# Patient Record
Sex: FEMALE | Race: WHITE
[De-identification: names, ages, dates, MRNs, and addresses within clinical notes are randomized per-mention and may not be internally consistent; named-entity substitution may affect disease eponyms.]

---

## 2018-03-18 ENCOUNTER — HOSPITAL ENCOUNTER (EMERGENCY)
Dept: HOSPITAL 12 - ER | Age: 68
LOS: 1 days | Discharge: HOME | End: 2018-03-19
Payer: MEDICARE

## 2018-03-18 VITALS — WEIGHT: 140 LBS | HEIGHT: 60 IN | BODY MASS INDEX: 27.48 KG/M2

## 2018-03-18 DIAGNOSIS — Z79.82: ICD-10-CM

## 2018-03-18 DIAGNOSIS — J44.1: ICD-10-CM

## 2018-03-18 DIAGNOSIS — J20.9: Primary | ICD-10-CM

## 2018-03-18 DIAGNOSIS — Z79.899: ICD-10-CM

## 2018-03-18 DIAGNOSIS — E03.9: ICD-10-CM

## 2018-03-18 DIAGNOSIS — J45.901: ICD-10-CM

## 2018-03-18 DIAGNOSIS — Z79.51: ICD-10-CM

## 2018-03-18 LAB
ALP SERPL-CCNC: 68 U/L (ref 50–136)
ALT SERPL W/O P-5'-P-CCNC: 22 U/L (ref 14–59)
AST SERPL-CCNC: 20 U/L (ref 15–37)
BASOPHILS # BLD AUTO: 0.1 K/UL (ref 0–8)
BASOPHILS NFR BLD AUTO: 1 % (ref 0–2)
BILIRUB DIRECT SERPL-MCNC: 0.1 MG/DL (ref 0–0.2)
BILIRUB SERPL-MCNC: 0.3 MG/DL (ref 0.2–1)
BUN SERPL-MCNC: 17 MG/DL (ref 7–18)
CHLORIDE SERPL-SCNC: 101 MMOL/L (ref 98–107)
CO2 SERPL-SCNC: 29 MMOL/L (ref 21–32)
CREAT SERPL-MCNC: 0.9 MG/DL (ref 0.6–1.3)
EOSINOPHIL # BLD AUTO: 0.7 K/UL (ref 0–0.7)
EOSINOPHIL NFR BLD AUTO: 7.4 % (ref 0–7)
GLUCOSE SERPL-MCNC: 104 MG/DL (ref 74–106)
HCT VFR BLD AUTO: 41.6 % (ref 31.2–41.9)
HGB BLD-MCNC: 13.5 G/DL (ref 10.9–14.3)
LYMPHOCYTES # BLD AUTO: 3.2 K/UL (ref 20–40)
LYMPHOCYTES NFR BLD AUTO: 34.5 % (ref 20.5–51.5)
MCH RBC QN AUTO: 28.2 UUG (ref 24.7–32.8)
MCHC RBC AUTO-ENTMCNC: 33 G/DL (ref 32.3–35.6)
MCV RBC AUTO: 86.9 FL (ref 75.5–95.3)
MONOCYTES # BLD AUTO: 1.2 K/UL (ref 2–10)
MONOCYTES NFR BLD AUTO: 12.8 % (ref 0–11)
NEUTROPHILS # BLD AUTO: 4.1 K/UL (ref 1.8–8.9)
NEUTROPHILS NFR BLD AUTO: 44.3 % (ref 38.5–71.5)
PLATELET # BLD AUTO: 259 K/UL (ref 179–408)
POTASSIUM SERPL-SCNC: 4.3 MMOL/L (ref 3.5–5.1)
RBC # BLD AUTO: 4.79 MIL/UL (ref 3.63–4.92)
WBC # BLD AUTO: 9.3 K/UL (ref 3.8–11.8)
WS STN SPEC: 7.7 G/DL (ref 6.4–8.2)

## 2018-03-18 PROCEDURE — A4663 DIALYSIS BLOOD PRESSURE CUFF: HCPCS

## 2018-03-18 NOTE — NUR
PT IN BED. PT'S SPOUSE AT BEDSIDE. PT IS RECEIVING BREATHING TX. PT IS A&OX4. 
BREATHING TX WELL TOLERATED. VS WNL. WILL RE-ASSESS RESPIRATORY STATUS AFTER 
BREATHING TX.

## 2018-03-18 NOTE — NUR
PT IN BED. PT'S SPOUSE AT BEDSIDE. PT'S BREATHING SOUNDS ARE REGULAR AND 
UNLABORED. PT IS ON NASAL CANULA @ 2 LTS. PT IS RECEIVING ANTIBIOTIC THERAPY. 
TX BEING TOLERATED WELL. NO SIGNS OR SYMPTOMS OF DISTRESS WITNESSED BY NURSE OR 
EXPRESSED BY PT.

## 2018-03-19 VITALS — SYSTOLIC BLOOD PRESSURE: 104 MMHG | DIASTOLIC BLOOD PRESSURE: 63 MMHG

## 2018-03-19 NOTE — NUR
Patient discharged to home in stable conditon.  Written and verbal after care 
instructions given. 

Patient verbalizes understanding of instructions. Patient able to reach an 
oxygen saturation level above 95% on room air and reporting no difficulties in 
breathing. Patient able to ambulate unassisted with steady gait. Peripheral IV 
removed. Patient left with all personal belongings.

## 2018-03-20 ENCOUNTER — HOSPITAL ENCOUNTER (INPATIENT)
Dept: HOSPITAL 12 - ER | Age: 68
LOS: 4 days | Discharge: HOME | DRG: 202 | End: 2018-03-24
Payer: MEDICAID

## 2018-03-20 VITALS — DIASTOLIC BLOOD PRESSURE: 58 MMHG | SYSTOLIC BLOOD PRESSURE: 100 MMHG

## 2018-03-20 VITALS — BODY MASS INDEX: 26.58 KG/M2 | HEIGHT: 63 IN | WEIGHT: 150 LBS

## 2018-03-20 VITALS — SYSTOLIC BLOOD PRESSURE: 135 MMHG | DIASTOLIC BLOOD PRESSURE: 66 MMHG

## 2018-03-20 VITALS — DIASTOLIC BLOOD PRESSURE: 58 MMHG | SYSTOLIC BLOOD PRESSURE: 115 MMHG

## 2018-03-20 VITALS — DIASTOLIC BLOOD PRESSURE: 63 MMHG | SYSTOLIC BLOOD PRESSURE: 114 MMHG

## 2018-03-20 DIAGNOSIS — E03.9: ICD-10-CM

## 2018-03-20 DIAGNOSIS — E87.2: ICD-10-CM

## 2018-03-20 DIAGNOSIS — Z79.82: ICD-10-CM

## 2018-03-20 DIAGNOSIS — H91.90: ICD-10-CM

## 2018-03-20 DIAGNOSIS — M19.019: ICD-10-CM

## 2018-03-20 DIAGNOSIS — J45.901: Primary | ICD-10-CM

## 2018-03-20 DIAGNOSIS — I50.32: ICD-10-CM

## 2018-03-20 DIAGNOSIS — E86.0: ICD-10-CM

## 2018-03-20 DIAGNOSIS — E78.5: ICD-10-CM

## 2018-03-20 DIAGNOSIS — J20.9: ICD-10-CM

## 2018-03-20 DIAGNOSIS — E78.00: ICD-10-CM

## 2018-03-20 LAB
ALP SERPL-CCNC: 66 U/L (ref 50–136)
ALT SERPL W/O P-5'-P-CCNC: 24 U/L (ref 14–59)
AST SERPL-CCNC: 23 U/L (ref 15–37)
BASOPHILS # BLD AUTO: 0 K/UL (ref 0–8)
BASOPHILS NFR BLD AUTO: 0.3 % (ref 0–2)
BILIRUB DIRECT SERPL-MCNC: 0.1 MG/DL (ref 0–0.2)
BILIRUB SERPL-MCNC: 0.3 MG/DL (ref 0.2–1)
BUN SERPL-MCNC: 18 MG/DL (ref 7–18)
CHLORIDE SERPL-SCNC: 104 MMOL/L (ref 98–107)
CO2 SERPL-SCNC: 26 MMOL/L (ref 21–32)
CREAT SERPL-MCNC: 0.9 MG/DL (ref 0.6–1.3)
EOSINOPHIL # BLD AUTO: 0 K/UL (ref 0–0.7)
EOSINOPHIL NFR BLD AUTO: 0 % (ref 0–7)
GLUCOSE SERPL-MCNC: 153 MG/DL (ref 74–106)
HCT VFR BLD AUTO: 41.3 % (ref 31.2–41.9)
HGB BLD-MCNC: 13.5 G/DL (ref 10.9–14.3)
LYMPHOCYTES # BLD AUTO: 1 K/UL (ref 20–40)
LYMPHOCYTES NFR BLD AUTO: 6.3 % (ref 20.5–51.5)
MCH RBC QN AUTO: 28.4 UUG (ref 24.7–32.8)
MCHC RBC AUTO-ENTMCNC: 33 G/DL (ref 32.3–35.6)
MCV RBC AUTO: 86.7 FL (ref 75.5–95.3)
MONOCYTES # BLD AUTO: 0.3 K/UL (ref 2–10)
MONOCYTES NFR BLD AUTO: 2.2 % (ref 0–11)
NEUTROPHILS # BLD AUTO: 13.9 K/UL (ref 1.8–8.9)
NEUTROPHILS NFR BLD AUTO: 91.2 % (ref 38.5–71.5)
PLATELET # BLD AUTO: 231 K/UL (ref 179–408)
POTASSIUM SERPL-SCNC: 4.8 MMOL/L (ref 3.5–5.1)
RBC # BLD AUTO: 4.77 MIL/UL (ref 3.63–4.92)
WBC # BLD AUTO: 15.3 K/UL (ref 3.8–11.8)
WS STN SPEC: 7.3 G/DL (ref 6.4–8.2)

## 2018-03-20 PROCEDURE — A4663 DIALYSIS BLOOD PRESSURE CUFF: HCPCS

## 2018-03-20 RX ADMIN — ALBUTEROL SULFATE SCH MG: 2.5 SOLUTION RESPIRATORY (INHALATION) at 08:22

## 2018-03-20 RX ADMIN — IPRATROPIUM BROMIDE SCH MG: 0.5 SOLUTION RESPIRATORY (INHALATION) at 15:03

## 2018-03-20 RX ADMIN — ALBUTEROL SULFATE SCH MG: 2.5 SOLUTION RESPIRATORY (INHALATION) at 04:12

## 2018-03-20 RX ADMIN — ALBUTEROL SULFATE SCH MG: 2.5 SOLUTION RESPIRATORY (INHALATION) at 23:30

## 2018-03-20 RX ADMIN — ALBUTEROL SULFATE SCH MG: 2.5 SOLUTION RESPIRATORY (INHALATION) at 19:30

## 2018-03-20 RX ADMIN — ALBUTEROL SULFATE SCH MG: 2.5 SOLUTION RESPIRATORY (INHALATION) at 07:27

## 2018-03-20 RX ADMIN — ACETAMINOPHEN PRN MG: 325 TABLET ORAL at 11:01

## 2018-03-20 RX ADMIN — ALBUTEROL SULFATE SCH MG: 2.5 SOLUTION RESPIRATORY (INHALATION) at 15:30

## 2018-03-20 RX ADMIN — IPRATROPIUM BROMIDE SCH MG: 0.5 SOLUTION RESPIRATORY (INHALATION) at 22:57

## 2018-03-20 RX ADMIN — IPRATROPIUM BROMIDE SCH MG: 0.5 SOLUTION RESPIRATORY (INHALATION) at 11:30

## 2018-03-20 RX ADMIN — ACETAMINOPHEN PRN MG: 325 TABLET ORAL at 20:29

## 2018-03-20 RX ADMIN — LEVOTHYROXINE SODIUM SCH MCG: 75 TABLET ORAL at 11:00

## 2018-03-20 RX ADMIN — IPRATROPIUM BROMIDE SCH MG: 0.5 SOLUTION RESPIRATORY (INHALATION) at 19:14

## 2018-03-20 RX ADMIN — ATORVASTATIN CALCIUM SCH MG: 20 TABLET, FILM COATED ORAL at 10:45

## 2018-03-20 RX ADMIN — ALBUTEROL SULFATE SCH MG: 2.5 SOLUTION RESPIRATORY (INHALATION) at 11:30

## 2018-03-20 RX ADMIN — DEXTROSE SCH MLS/HR: 5 SOLUTION INTRAVENOUS at 11:31

## 2018-03-20 RX ADMIN — ALBUTEROL SULFATE SCH MG: 2.5 SOLUTION RESPIRATORY (INHALATION) at 19:14

## 2018-03-20 RX ADMIN — ALBUTEROL SULFATE SCH MG: 2.5 SOLUTION RESPIRATORY (INHALATION) at 22:57

## 2018-03-20 RX ADMIN — IPRATROPIUM BROMIDE SCH MG: 0.5 SOLUTION RESPIRATORY (INHALATION) at 04:12

## 2018-03-20 RX ADMIN — IPRATROPIUM BROMIDE SCH MG: 0.5 SOLUTION RESPIRATORY (INHALATION) at 07:27

## 2018-03-20 RX ADMIN — ATORVASTATIN CALCIUM SCH MG: 20 TABLET, FILM COATED ORAL at 20:29

## 2018-03-20 RX ADMIN — ALBUTEROL SULFATE SCH MG: 2.5 SOLUTION RESPIRATORY (INHALATION) at 15:04

## 2018-03-20 NOTE — NUR
PATIENT CONTINUE TO REFUSE TO HAVE HER IV RECONNECTED SO I CALLED AND NOTIFIED DR JENKINS 
OF THE REFUSAL WITH NO NEW ORDERS AT THIS TIME.

## 2018-03-20 NOTE — NUR
AMBULATING IN THE HALLWAY STATED THAT SHE WAS OKAY WITHOUT THE O2 BUT PUTS IT BACK AS SOON 
AS SHE GOT BACK INTO HER ROOM STATED THAT SHE IS FEELING BETTER .

## 2018-03-20 NOTE — NUR
AOX4. DENIES ANY PAIN OR SOB AT THIS TIME. IN NO ACUTE DISTRESS. ON O2 AT 2LPM VIA NC. O2 
SAT AT 96%. VS WNL. OCCASIONAL DRY COUGH. IV ON RIGHT AC INTACT AND PATENT. IVF INFUSING. SR 
WITH PVC'S ON TELE. SAFETY MEASURE MAINTAINED AND CALL BELL WITHIN REACH.

## 2018-03-20 NOTE — NUR
Pt continues to refuse IV fluids.  Pt requesting to remove IV line because she doesn't want 
it. She says "it's broken." Flushed IV site and changed the dressing.  Told pt she needs IV 
access to receive her medications. Pt nodded understanding.  Pt complained she did not 
receive her 7pm breathing treatment. Contact RT who said she had refused it.  PT will get 
her a breathing treatment now.

## 2018-03-20 NOTE — NUR
PATIENT WAS SEEN AND EXAMINED BY DR JENKINS WITH NEW ORDERS AND NOTED CONTINUE WITH HAND 
HELD NEBULIZER AND PATIENT STILL WITH SOBE WITH O2 AS ORDERED WITH SATS WNL AT THIS TIME

## 2018-03-20 NOTE — NUR
Pt is awake and walking around in her room at this time. Refusing to reconnect IV pump to 
run IV fluids.  Encouraged PO fluid intake. Makes all needs known.  Call light within reach.

## 2018-03-20 NOTE — NUR
PATIENT STATED THAT SHE HAS A HEADACHE MEDICATED WITH TYLENOL AS ORDERED PER THE PATIENT 
REQUEST MADE COMFORTABLE AND WILL CONTINUE TO OBSERVE.

## 2018-03-20 NOTE — NUR
RECEIVED PATIENT IN BED AWAKE ALERT AND ORIENTED WITH LANGUAGE BARRIER WITH LITTLE ENGLISH 
ASSISTED WITH AMBULATING TO THE BATHROOM TO VOID.ON O2 IN PROGRESS WITH SOBE.BREATHING 
TREATMENT GIVEN AS ORDERED AND HELPFUL.PATIENT HAS ORDER FOR INFILTERATION AT THIS 
TIME.PATIENT FINALLY AGREED FOR LABS TO BE DONE BUT CONTINUE TO REFUSE FOR INFLUENZA A AND B 
AS ORDERED.EXPLAINED TO THE PATIENT THAT IT IS NECESSRY FOR THIS TEST BUT SHE 
REFUSED.PATIENTS RIGHTS TO REFUSE RESPECTED AT THIS TIME WILL INFORM THE DOCTOR.

## 2018-03-20 NOTE — NUR
ADMITTED A 66 Y/O FEMALE WITH DX OF ACUTE ASTHMA. ACCOMPANIED BY  VU. PT MAINLY 
FARSI, SPEAKS AND UNDERSTAND LIMITED ENGLISH.  HAD TO TRANSLATE FOR THIS NURSE. 
ROUTINE ADMISSION CARE DONE. PLAN OF CARE INITIATED. SAFETY MEASURES INITIATED.

## 2018-03-20 NOTE — NUR
Patient refsuing Hep Lock Dr Beckwith made aware. Explained the purpose of blood 
work,EKG and Hep lock by DR Beckwith and staff nurse to patient and staff member

## 2018-03-21 VITALS — DIASTOLIC BLOOD PRESSURE: 45 MMHG | SYSTOLIC BLOOD PRESSURE: 95 MMHG

## 2018-03-21 VITALS — DIASTOLIC BLOOD PRESSURE: 64 MMHG | SYSTOLIC BLOOD PRESSURE: 120 MMHG

## 2018-03-21 VITALS — SYSTOLIC BLOOD PRESSURE: 122 MMHG | DIASTOLIC BLOOD PRESSURE: 67 MMHG

## 2018-03-21 VITALS — DIASTOLIC BLOOD PRESSURE: 72 MMHG | SYSTOLIC BLOOD PRESSURE: 128 MMHG

## 2018-03-21 VITALS — DIASTOLIC BLOOD PRESSURE: 81 MMHG | SYSTOLIC BLOOD PRESSURE: 146 MMHG

## 2018-03-21 LAB
BASOPHILS # BLD AUTO: 0 K/UL (ref 0–8)
BASOPHILS NFR BLD AUTO: 0.1 % (ref 0–2)
BUN SERPL-MCNC: 16 MG/DL (ref 7–18)
CHLORIDE SERPL-SCNC: 105 MMOL/L (ref 98–107)
CO2 SERPL-SCNC: 30 MMOL/L (ref 21–32)
CREAT SERPL-MCNC: 0.8 MG/DL (ref 0.6–1.3)
EOSINOPHIL # BLD AUTO: 0 K/UL (ref 0–0.7)
EOSINOPHIL NFR BLD AUTO: 0 % (ref 0–7)
GLUCOSE SERPL-MCNC: 149 MG/DL (ref 74–106)
HCT VFR BLD AUTO: 37.8 % (ref 31.2–41.9)
HGB BLD-MCNC: 12.3 G/DL (ref 10.9–14.3)
LYMPHOCYTES # BLD AUTO: 0.7 K/UL (ref 20–40)
LYMPHOCYTES NFR BLD AUTO: 7.3 % (ref 20.5–51.5)
MAGNESIUM SERPL-MCNC: 2 MG/DL (ref 1.8–2.4)
MCH RBC QN AUTO: 28.2 UUG (ref 24.7–32.8)
MCHC RBC AUTO-ENTMCNC: 33 G/DL (ref 32.3–35.6)
MCV RBC AUTO: 86.6 FL (ref 75.5–95.3)
MONOCYTES # BLD AUTO: 0.4 K/UL (ref 2–10)
MONOCYTES NFR BLD AUTO: 3.7 % (ref 0–11)
NEUTROPHILS # BLD AUTO: 8.7 K/UL (ref 1.8–8.9)
NEUTROPHILS NFR BLD AUTO: 88.9 % (ref 38.5–71.5)
PHOSPHATE SERPL-MCNC: 3.4 MG/DL (ref 2.5–4.9)
PLATELET # BLD AUTO: 222 K/UL (ref 179–408)
POTASSIUM SERPL-SCNC: 4.7 MMOL/L (ref 3.5–5.1)
RBC # BLD AUTO: 4.36 MIL/UL (ref 3.63–4.92)
WBC # BLD AUTO: 9.7 K/UL (ref 3.8–11.8)

## 2018-03-21 RX ADMIN — ATORVASTATIN CALCIUM SCH MG: 20 TABLET, FILM COATED ORAL at 20:48

## 2018-03-21 RX ADMIN — DEXTROSE SCH MLS/HR: 5 SOLUTION INTRAVENOUS at 10:27

## 2018-03-21 RX ADMIN — IPRATROPIUM BROMIDE SCH MG: 0.5 SOLUTION RESPIRATORY (INHALATION) at 19:30

## 2018-03-21 RX ADMIN — ALBUTEROL SULFATE SCH MG: 2.5 SOLUTION RESPIRATORY (INHALATION) at 15:44

## 2018-03-21 RX ADMIN — IPRATROPIUM BROMIDE SCH MG: 0.5 SOLUTION RESPIRATORY (INHALATION) at 23:08

## 2018-03-21 RX ADMIN — ALBUTEROL SULFATE SCH MG: 2.5 SOLUTION RESPIRATORY (INHALATION) at 07:23

## 2018-03-21 RX ADMIN — IPRATROPIUM BROMIDE SCH MG: 0.5 SOLUTION RESPIRATORY (INHALATION) at 11:28

## 2018-03-21 RX ADMIN — IPRATROPIUM BROMIDE SCH MG: 0.5 SOLUTION RESPIRATORY (INHALATION) at 07:23

## 2018-03-21 RX ADMIN — LEVOTHYROXINE SODIUM SCH MCG: 75 TABLET ORAL at 06:40

## 2018-03-21 RX ADMIN — ACETAMINOPHEN PRN MG: 325 TABLET ORAL at 20:51

## 2018-03-21 RX ADMIN — ALBUTEROL SULFATE SCH MG: 2.5 SOLUTION RESPIRATORY (INHALATION) at 11:28

## 2018-03-21 RX ADMIN — ALBUTEROL SULFATE SCH MG: 2.5 SOLUTION RESPIRATORY (INHALATION) at 19:30

## 2018-03-21 RX ADMIN — ALBUTEROL SULFATE SCH MG: 2.5 SOLUTION RESPIRATORY (INHALATION) at 07:24

## 2018-03-21 RX ADMIN — ALBUTEROL SULFATE SCH MG: 2.5 SOLUTION RESPIRATORY (INHALATION) at 23:08

## 2018-03-21 RX ADMIN — IPRATROPIUM BROMIDE SCH MG: 0.5 SOLUTION RESPIRATORY (INHALATION) at 15:44

## 2018-03-21 NOTE — NUR
Patient sitting in chair, in no distress, no SOB. Patient occasionally walks in the hallway, 
gets anxious. Discussed with patient regarding safety/fall risk and to use call light when 
assistance is needed. Patient education/teachings provided regarding medications and 
treatment. Patient verbalized understanding.

## 2018-03-21 NOTE — NUR
PT RECEIVED IN BED, AWAKE. A/OX3. ABLE TO MAKE NEEDS KNOWN. V/S STABLE. IN NO ACUTE 
DISTRESS. NO C/O PAIN AT THIS TIME. IV INTACT AND PATENT, HEP-LOCKED. PT REFUSES IV FLUIDS, 
MAD AWARE. NO C/O OF SOB. PT ON 2LNC, TOLERATING WELL.PT REFUSES ROUTINE BREATHING TREATMENT 
AT THIS TIME. PT STATES SHE WOULD LIKE THE RESPIRATORY THERAPIST TO RETURN AT 11PM. SAFETY 
MEASURES IMPLEMENTED. BED ALARM SET. CALL LIGHT WITHIN REACH.

## 2018-03-21 NOTE — NUR
PT C/O HEADACHE PAIN 4/10. ADMINISTERED TYLENOL AS ORDERED. IN STABLE CONDITION. WILL CONT 
TO MONITOR.

## 2018-03-22 VITALS — SYSTOLIC BLOOD PRESSURE: 151 MMHG | DIASTOLIC BLOOD PRESSURE: 90 MMHG

## 2018-03-22 VITALS — DIASTOLIC BLOOD PRESSURE: 87 MMHG | SYSTOLIC BLOOD PRESSURE: 141 MMHG

## 2018-03-22 VITALS — DIASTOLIC BLOOD PRESSURE: 65 MMHG | SYSTOLIC BLOOD PRESSURE: 128 MMHG

## 2018-03-22 VITALS — SYSTOLIC BLOOD PRESSURE: 127 MMHG | DIASTOLIC BLOOD PRESSURE: 91 MMHG

## 2018-03-22 LAB
BASOPHILS # BLD AUTO: 0 K/UL (ref 0–8)
BASOPHILS NFR BLD AUTO: 0.2 % (ref 0–2)
BILIRUB DIRECT SERPL-MCNC: 0.1 MG/DL (ref 0–0.2)
BILIRUB SERPL-MCNC: 0.2 MG/DL (ref 0.2–1)
EOSINOPHIL # BLD AUTO: 0 K/UL (ref 0–0.7)
EOSINOPHIL NFR BLD AUTO: 0 % (ref 0–7)
HCT VFR BLD AUTO: 42.1 % (ref 31.2–41.9)
HGB BLD-MCNC: 13.9 G/DL (ref 10.9–14.3)
LYMPHOCYTES # BLD AUTO: 1 K/UL (ref 20–40)
LYMPHOCYTES NFR BLD AUTO: 6.7 % (ref 20.5–51.5)
MCH RBC QN AUTO: 28.4 UUG (ref 24.7–32.8)
MCHC RBC AUTO-ENTMCNC: 33 G/DL (ref 32.3–35.6)
MCV RBC AUTO: 86 FL (ref 75.5–95.3)
MONOCYTES # BLD AUTO: 0.6 K/UL (ref 2–10)
MONOCYTES NFR BLD AUTO: 4.5 % (ref 0–11)
NEUTROPHILS # BLD AUTO: 12.9 K/UL (ref 1.8–8.9)
NEUTROPHILS NFR BLD AUTO: 88.6 % (ref 38.5–71.5)
PLATELET # BLD AUTO: 269 K/UL (ref 179–408)
RBC # BLD AUTO: 4.9 MIL/UL (ref 3.63–4.92)
WBC # BLD AUTO: 14.5 K/UL (ref 3.8–11.8)

## 2018-03-22 RX ADMIN — IPRATROPIUM BROMIDE SCH MG: 0.5 SOLUTION RESPIRATORY (INHALATION) at 19:13

## 2018-03-22 RX ADMIN — AZITHROMYCIN SCH MG: 250 TABLET, FILM COATED ORAL at 10:00

## 2018-03-22 RX ADMIN — ALBUTEROL SULFATE SCH MG: 2.5 SOLUTION RESPIRATORY (INHALATION) at 19:14

## 2018-03-22 RX ADMIN — ACETAMINOPHEN PRN MG: 325 TABLET ORAL at 19:43

## 2018-03-22 RX ADMIN — IPRATROPIUM BROMIDE SCH MG: 0.5 SOLUTION RESPIRATORY (INHALATION) at 07:32

## 2018-03-22 RX ADMIN — ATORVASTATIN CALCIUM SCH MG: 20 TABLET, FILM COATED ORAL at 20:00

## 2018-03-22 RX ADMIN — ALBUTEROL SULFATE SCH MG: 2.5 SOLUTION RESPIRATORY (INHALATION) at 15:49

## 2018-03-22 RX ADMIN — IPRATROPIUM BROMIDE SCH MG: 0.5 SOLUTION RESPIRATORY (INHALATION) at 11:48

## 2018-03-22 RX ADMIN — ALBUTEROL SULFATE SCH MG: 2.5 SOLUTION RESPIRATORY (INHALATION) at 07:32

## 2018-03-22 RX ADMIN — HYDROCODONE BITARTRATE AND HOMATROPINE METHYLBROMIDE PRN ML: 5; 1.5 SOLUTION ORAL at 03:39

## 2018-03-22 RX ADMIN — ALBUTEROL SULFATE SCH MG: 2.5 SOLUTION RESPIRATORY (INHALATION) at 11:48

## 2018-03-22 RX ADMIN — IPRATROPIUM BROMIDE SCH MG: 0.5 SOLUTION RESPIRATORY (INHALATION) at 15:49

## 2018-03-22 RX ADMIN — ACETYLCYSTEINE SCH MG: 100 INHALANT RESPIRATORY (INHALATION) at 19:13

## 2018-03-22 RX ADMIN — LEVOTHYROXINE SODIUM SCH MCG: 75 TABLET ORAL at 06:31

## 2018-03-22 NOTE — NUR
Patient anxious, c/o of SOB. SOB upon exertion. Patient seen by MD. Patient 
education/teaching regarding treatment and medications provided. Instructed patient not to 
ambulated without assistance but patient still insists on walking. Safety measures in place, 
bed alarm on, call light within reach.

## 2018-03-22 NOTE — NUR
PT CONT TO C/O OF COUGHING. STATES SHE IS UNABLE TO SLEEP, ANXIOUS AD HOT. NP IN WITH 
PATIENT. ADMINISTERED HYDROMET AS ORDERED. IN STABLE CONDITION. WILL CONT TO MONITOR.

## 2018-03-22 NOTE — NUR
END OF SHIFT NOTES. PT SLEPT INTERMITTENTLY THROUGHOUT SHIFT. IN STABLE CONDITION. PT COUGH 
CEASED, CONT ON 2LNC TOLERATING WELL. AFEBRILE. CONT TO REFUSE IV FLUIDS, BUT TOLERATES 
FLUID INTAKE WELL. ENCOURAGED TO INCREASE FLUID INTAKE. ALL NEEDS ATTENDED. SAFETY 
MAINTAINED. CALL LIGHT WITHIN REACH.

## 2018-03-22 NOTE — NUR
Patient walking in the hallway without oxygen. Oxygen saturating at 88% on room air, patient 
unable to tolerate room air, experiencing SOB upon exertion. Instructed patient to keep 
oxygen on, patient verbalized understanding. Will endorse to oncoming shift RN.

## 2018-03-23 VITALS — SYSTOLIC BLOOD PRESSURE: 149 MMHG | DIASTOLIC BLOOD PRESSURE: 69 MMHG

## 2018-03-23 VITALS — DIASTOLIC BLOOD PRESSURE: 76 MMHG | SYSTOLIC BLOOD PRESSURE: 130 MMHG

## 2018-03-23 VITALS — SYSTOLIC BLOOD PRESSURE: 137 MMHG | DIASTOLIC BLOOD PRESSURE: 77 MMHG

## 2018-03-23 VITALS — DIASTOLIC BLOOD PRESSURE: 71 MMHG | SYSTOLIC BLOOD PRESSURE: 130 MMHG

## 2018-03-23 RX ADMIN — ALBUTEROL SULFATE SCH MG: 2.5 SOLUTION RESPIRATORY (INHALATION) at 19:19

## 2018-03-23 RX ADMIN — IPRATROPIUM BROMIDE SCH MG: 0.5 SOLUTION RESPIRATORY (INHALATION) at 07:13

## 2018-03-23 RX ADMIN — IPRATROPIUM BROMIDE SCH MG: 0.5 SOLUTION RESPIRATORY (INHALATION) at 11:43

## 2018-03-23 RX ADMIN — ACETAMINOPHEN PRN MG: 325 TABLET ORAL at 15:09

## 2018-03-23 RX ADMIN — ACETYLCYSTEINE SCH MG: 100 INHALANT RESPIRATORY (INHALATION) at 07:13

## 2018-03-23 RX ADMIN — ALBUTEROL SULFATE SCH MG: 2.5 SOLUTION RESPIRATORY (INHALATION) at 07:14

## 2018-03-23 RX ADMIN — ALBUTEROL SULFATE SCH MG: 2.5 SOLUTION RESPIRATORY (INHALATION) at 15:30

## 2018-03-23 RX ADMIN — LEVOTHYROXINE SODIUM SCH MCG: 75 TABLET ORAL at 06:30

## 2018-03-23 RX ADMIN — ALBUTEROL SULFATE SCH MG: 2.5 SOLUTION RESPIRATORY (INHALATION) at 11:43

## 2018-03-23 RX ADMIN — IPRATROPIUM BROMIDE SCH MG: 0.5 SOLUTION RESPIRATORY (INHALATION) at 19:19

## 2018-03-23 RX ADMIN — ACETYLCYSTEINE SCH MG: 100 INHALANT RESPIRATORY (INHALATION) at 19:19

## 2018-03-23 RX ADMIN — AZITHROMYCIN SCH MG: 250 TABLET, FILM COATED ORAL at 10:46

## 2018-03-23 RX ADMIN — IPRATROPIUM BROMIDE SCH MG: 0.5 SOLUTION RESPIRATORY (INHALATION) at 15:30

## 2018-03-23 RX ADMIN — ATORVASTATIN CALCIUM SCH MG: 20 TABLET, FILM COATED ORAL at 21:08

## 2018-03-23 NOTE — NUR
End of shift notes: Patient has been compliant with nursing care and medications. Patient 
has been noted to be walking around throughout the hallways. Patient is still noted with 
labored breathing. Noted patient takes off her NC. Incentive spirometer by bedside. Client 
spoke to  and stated her wishes to stay another night and  is ok with that

## 2018-03-23 NOTE — NUR
Received patient sitting on a chair with her breathing treatment on. Noted the patient with 
deep and labored breathing. No apparent s/s of pain. Patient is alert and oriented, 
ambulatory and able to verbalize her needs. No IV running at this time.

## 2018-03-23 NOTE — NUR
Pt slept well through the night. C/O SOB with exertion.  Reminded patient to stay in her 
room and conserve energy.  On 02 at 2L and saturation of 93%.  Continues to refuse IV 
fluids.  Will only allow IV medications. Only requested Tylenol once at bedtime for 
headache.

## 2018-03-23 NOTE — NUR
Patient was informed that there was discharge orders for later today. Pt refused and stated 
not today but till tomorrow

## 2018-03-23 NOTE — NUR
advised to remove patient's oxygen and have her walk around and take her oxygen levels 
after. Pt has been already walking around the hallways, noted O2 at 82% RA.  advice aware 
and stated to given her a incentive spirometer. After explaining the how to use it, the 
patient oxygen levels went up to 85% RA

## 2018-03-23 NOTE — NUR
RECEIVED PATIENT SEATED IN CHAIR. SHE DENIES PAIN OR RESP DISTRESS. SAFETY AND COMFORT 
MEASURES IN PLACE, CALL LIGHT LEFT WITHIN PATIENT'S REACH. NO CONCERNS AT THIS TIME, WILL 
CONTINUE TO MONITOR PATIENT

## 2018-03-24 VITALS — DIASTOLIC BLOOD PRESSURE: 61 MMHG | SYSTOLIC BLOOD PRESSURE: 103 MMHG

## 2018-03-24 VITALS — DIASTOLIC BLOOD PRESSURE: 76 MMHG | SYSTOLIC BLOOD PRESSURE: 125 MMHG

## 2018-03-24 RX ADMIN — IPRATROPIUM BROMIDE SCH MG: 0.5 SOLUTION RESPIRATORY (INHALATION) at 07:31

## 2018-03-24 RX ADMIN — AZITHROMYCIN SCH MG: 250 TABLET, FILM COATED ORAL at 11:28

## 2018-03-24 RX ADMIN — HYDROCODONE BITARTRATE AND HOMATROPINE METHYLBROMIDE PRN ML: 5; 1.5 SOLUTION ORAL at 11:27

## 2018-03-24 RX ADMIN — IPRATROPIUM BROMIDE SCH MG: 0.5 SOLUTION RESPIRATORY (INHALATION) at 11:30

## 2018-03-24 RX ADMIN — ACETYLCYSTEINE SCH MG: 100 INHALANT RESPIRATORY (INHALATION) at 07:32

## 2018-03-24 RX ADMIN — ALBUTEROL SULFATE SCH MG: 2.5 SOLUTION RESPIRATORY (INHALATION) at 11:30

## 2018-03-24 RX ADMIN — ALBUTEROL SULFATE SCH MG: 2.5 SOLUTION RESPIRATORY (INHALATION) at 07:31

## 2018-03-24 RX ADMIN — LEVOTHYROXINE SODIUM SCH MCG: 75 TABLET ORAL at 07:06

## 2018-03-24 NOTE — NUR
ALL  HOME  INSTRUCTIONS  REVIEWED  WITH  PT.  AND  PT'S.    INCLUDING  HOME  RX'S.  
IV            D/C'D.

## 2018-03-24 NOTE — NUR
HOME  INSTRUCTIONS  RE--REVIEWED  WITH  PT.  AND  PT'S.  .  DISCHARGED  TO    
VIA  W/C  BY  ANDRÉS.

## 2018-03-24 NOTE — NUR
PATIENT SLEPT WELL THROUGH THE SHIFT, SHE DENIED PAIN OR ANY DISCOMFORT. NO RESPIRATORY 
DISTRESS ON SHIFT, PATIENT IS STABLE WITH NO SIGNIFICANT CHANGES IN STATUS. SAFETY MEASURES 
MAINTAINED AT ALL TIMES

## 2024-04-17 NOTE — NUR
Patient refusing IVF, patient stated "antibiotics ok, not that one (pointing at the IVF 
bag)". MD aware. IV antibiotic administered as ordered. Physical Therapy Visit        SUBJECTIVE                                                                                                               Patient arrives 5 minutes late.    Patient reports that she has been trying to get to the exercises 1-2 times per day depending on working and helping her daughter and son-in-law. She states that she is happy with her progress so far.      OBJECTIVE                                                                                                                                    Treatment     Therapeutic Exercise  Education and instruction in:  - walking lunges x2 laps  - standing hip abduction on step 3x10  - seated hip adduction versus small ball 5 second holds 2x10  - deadlift with 5 pound kettlebell and instruction to avoid thoracic kyphosis 3x10  - transverse abdominis engagement with mini march 2x10  - row with free motion 5 pounds each 3x10  - doorway stretch at 60 degrees 2x30 seconds    Updated home exercise program as below.      Skilled input: verbal instruction/cues, tactile instruction/cues, posture correction and demonstration    Writer verbally educated and received verbal consent for hand placement, positioning of patient, and techniques to be performed today from patient for clothing adjustments for techniques, hand placement and palpation for techniques and therapist position for techniques as described above and how they are pertinent to the patient's plan of care.  Home Exercise Program  Access Code: [email or web address]  URL: [email or web address]  Date: 04/17/2024  Prepared by: Ale    Exercises  - Seated Gentle Upper Trapezius Stretch  - 1-2 x daily - 5-7 x weekly - 1-2 sets - 2 reps - 30 second hold  - Gentle Levator Scapulae Stretch  - 1-2 x daily - 5-7 x weekly - 1-2 sets - 2 reps - 30 second hold  - Shoulder Rolls in Sitting  - 1 x daily - 7 x weekly - 2-3 sets - 10 reps  - Supine March with Transverse Abdominis Engagement  - 1 x  daily - 7 x weekly - 3 sets - 10 reps  - Quick Flick Pelvic Floor Contractions in Hooklying  - 3 x daily - 7 x weekly - 1-2 sets - 10 reps  - Split Squat with Upper Extremity Support on Counter  - 1 x daily - 4-5 x weekly - 3 sets - 10 reps  - Sidelying Hip Abduction  - 1 x daily - 4-5 x weekly - 3 sets - 10 reps  - Supine Bridge with Mini Swiss Ball Between Knees  - 1 x daily - 4-5 x weekly - 3 sets - 10 reps  - Half Kneeling Hip Flexor Stretch  - 1 x daily - 4-5 x weekly - 1-2 sets - 2 reps - 30 second hold  - Standing Shoulder Row with Anchored Resistance  - 1 x daily - 4-5 x weekly - 3 sets - 10 reps  - Standing Low Trap Setting with Resistance at Wall  - 1 x daily - 4-5 x weekly - 3 sets - 10 reps  - Doorway Pec Stretch at 60 Degrees Abduction with Arm Straight  - 1 x daily - 7 x weekly - 1-2 sets - 2 reps - 30 second hold      ASSESSMENT                                                                                                            Patient initially demonstrated difficulty with transverse abdominis engagement, however this improved following verbal and tactile instruction. Overall improvement is indicated by Patient Specific Functional Scale from 5/10 at initial evaluation to 8/10 this date. Anticipate continued improvement with skilled physical therapy and adherence to home exercise program.    Education:   - Results of above outlined education: Verbalizes understanding, Demonstrates understanding and Needs reinforcement    PLAN                                                                                                                           Suggestions for next session as indicated: Progress per plan of care, biofeedback, lower extremity strengthening, core strengthening, plan for discharge  ,        Therapy procedure time and total treatment time can be found documented on the Time Entry flowsheet